# Patient Record
Sex: FEMALE | URBAN - METROPOLITAN AREA
[De-identification: names, ages, dates, MRNs, and addresses within clinical notes are randomized per-mention and may not be internally consistent; named-entity substitution may affect disease eponyms.]

---
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
## 2025-01-04 ENCOUNTER — HOSPITAL ENCOUNTER (EMERGENCY)
Age: 20
Discharge: HOME | End: 2025-01-04
Attending: EMERGENCY MEDICINE

## 2025-01-04 VITALS
SYSTOLIC BLOOD PRESSURE: 103 MMHG | OXYGEN SATURATION: 100 % | WEIGHT: 127 LBS | TEMPERATURE: 98.1 F | RESPIRATION RATE: 16 BRPM | BODY MASS INDEX: 21.16 KG/M2 | DIASTOLIC BLOOD PRESSURE: 75 MMHG | HEIGHT: 65 IN | HEART RATE: 55 BPM

## 2025-01-04 DIAGNOSIS — E86.0 DEHYDRATION: ICD-10-CM

## 2025-01-04 DIAGNOSIS — R19.7 NAUSEA VOMITING AND DIARRHEA: Primary | ICD-10-CM

## 2025-01-04 DIAGNOSIS — R11.2 NAUSEA VOMITING AND DIARRHEA: Primary | ICD-10-CM

## 2025-01-04 LAB
BILIRUB UR QL STRIP: NEGATIVE
CLARITY UR: CLEAR
COLOR UR: YELLOW
COMMENT: NORMAL
EKG ATRIAL RATE: 56 BPM
EKG P AXIS: 4 DEGREES
EKG P-R INTERVAL: 152 MS
EKG Q-T INTERVAL: 394 MS
EKG QRS DURATION: 84 MS
EKG QTC CALCULATION (BAZETT): 380 MS
EKG R AXIS: 82 DEGREES
EKG T AXIS: 60 DEGREES
EKG VENTRICULAR RATE: 56 BPM
GLUCOSE UR STRIP-MCNC: NEGATIVE MG/DL
HCG UR QL: NEGATIVE
HGB UR QL STRIP.AUTO: NEGATIVE
KETONES UR STRIP-MCNC: NEGATIVE MG/DL
LEUKOCYTE ESTERASE UR QL STRIP: NEGATIVE
NITRITE UR QL STRIP: NEGATIVE
PH UR STRIP: 6 [PH] (ref 5–8)
PROT UR STRIP-MCNC: NEGATIVE MG/DL
SP GR UR STRIP: 1.02 (ref 1–1.03)
UROBILINOGEN UR STRIP-ACNC: NORMAL EU/DL (ref 0–1)

## 2025-01-04 PROCEDURE — 93010 ELECTROCARDIOGRAM REPORT: CPT | Performed by: INTERNAL MEDICINE

## 2025-01-04 PROCEDURE — 81025 URINE PREGNANCY TEST: CPT

## 2025-01-04 PROCEDURE — 81003 URINALYSIS AUTO W/O SCOPE: CPT

## 2025-01-04 PROCEDURE — 93005 ELECTROCARDIOGRAM TRACING: CPT | Performed by: EMERGENCY MEDICINE

## 2025-01-04 PROCEDURE — 99284 EMERGENCY DEPT VISIT MOD MDM: CPT

## 2025-01-04 RX ORDER — ONDANSETRON 4 MG/1
4 TABLET, ORALLY DISINTEGRATING ORAL 3 TIMES DAILY PRN
Qty: 21 TABLET | Refills: 0 | Status: SHIPPED | OUTPATIENT
Start: 2025-01-04

## 2025-01-04 ASSESSMENT — ENCOUNTER SYMPTOMS
CONSTIPATION: 0
EYE PAIN: 0
DIARRHEA: 1
SHORTNESS OF BREATH: 0
COUGH: 0
NAUSEA: 1
VOMITING: 0
BACK PAIN: 0
ABDOMINAL PAIN: 0

## 2025-01-04 ASSESSMENT — PAIN - FUNCTIONAL ASSESSMENT
PAIN_FUNCTIONAL_ASSESSMENT: NONE - DENIES PAIN

## 2025-01-04 NOTE — ED PROVIDER NOTES
[facility]  eMERGENCY dEPARTMENT eNCOUnter      Pt Name: Jami Dunham  MRN: [id number]  Birthdate 2005  Date of evaluation: 1/4/2025      CHIEF COMPLAINT       Chief Complaint   Patient presents with    Diarrhea     Diarrhea that started last night, nausea, no emesis, pt may be pregnant.         HISTORY OF PRESENT ILLNESS    Jami Dunham is a 19 y.o. female who presents with an episode of lightheadedness and nausea.  She said last night she had diarrhea and then this morning was nauseated she does not think she is pregnant but there is a possibility she has no belly pain no vaginal bleeding she said last night she was crying a lot she says her crying for yuly as her and her boyfriend had to overcome some kind of difficulty they have been having.  She denies any chest pain or shortness of breath she did not completely pass out she said she is felt really lightheaded at work and her boss helped her sit down squad arrived and picked her up her pressure was in the 90s systolic and they transported her in right now she says she feels back to normal      REVIEW OF SYSTEMS         Review of Systems   Constitutional:  Positive for fatigue. Negative for chills and fever.   HENT:  Negative for congestion and ear pain.    Eyes:  Negative for pain and visual disturbance.   Respiratory:  Negative for cough and shortness of breath.    Cardiovascular:  Negative for chest pain, palpitations and leg swelling.   Gastrointestinal:  Positive for diarrhea and nausea. Negative for abdominal pain, constipation and vomiting.   Endocrine: Negative for polydipsia and polyuria.   Genitourinary:  Negative for difficulty urinating, dysuria, frequency, vaginal bleeding and vaginal discharge.   Musculoskeletal:  Negative for back pain, joint swelling, myalgias, neck pain and neck stiffness.   Skin:  Negative for rash.   Neurological:  Positive for light-headedness. Negative for dizziness, weakness and       General: No focal deficit present.      Mental Status: She is alert and oriented to person, place, and time.   Psychiatric:         Mood and Affect: Mood normal.         Behavior: Behavior normal.           DIFFERENTIAL DIAGNOSIS/ MDM:     Nausea with diarrhea decreased oral intake no abdominal pain will check hydration status EKG urine and urine pregnancy  DIAGNOSTIC RESULTS     EKG: All EKG's are interpreted by the Emergency Department Physician who either signs or Co-signs this chart in the absence of a cardiologist.    Sinus bradycardia with sinus arrhythmia rate of 56 bpm VT interval is 152 ms QRS durations 84 ms QT corrected 380 ms axis is 82 for the QRS there is no acute ST or T wave changes otherwise normal EKG    RADIOLOGY:   I directly visualized the following  images and reviewed the radiologist interpretations:  No orders to display             ED BEDSIDE ULTRASOUND:       LABS:  Labs Reviewed   URINALYSIS WITH REFLEX TO CULTURE   PREGNANCY, URINE         EMERGENCY DEPARTMENT COURSE:   Vitals:    Vitals:    01/04/25 0934   BP: 103/75   Pulse: 55   Resp: 16   Temp: 98.1 °F (36.7 °C)   TempSrc: Oral   SpO2: 100%   Weight: 57.6 kg (127 lb)   Height: 1.651 m (5' 5\")     -------------------------  BP: 103/75, Temp: 98.1 °F (36.7 °C), Pulse: 55, Respirations: 16        Re-evaluation Notes    Is resting comfortably has no symptoms now to get a little tachycardic with standing but says she can take oral fluids I will write her some Zofran she has a ride home    CRITICAL CARE:   None        CONSULTS:      PROCEDURES:  None    FINAL IMPRESSION      1. Nausea vomiting and diarrhea    2. Dehydration          DISPOSITION/PLAN   DISPOSITION Decision To Discharge 01/04/2025 11:24:36 AM   DISPOSITION CONDITION Stable           Condition on Disposition    stable    PATIENT REFERRED TO:  No follow-up provider specified.    DISCHARGE MEDICATIONS:  New Prescriptions    ONDANSETRON (ZOFRAN-ODT) 4 MG DISINTEGRATING

## 2025-06-05 ENCOUNTER — OFFICE VISIT (OUTPATIENT)
Dept: OBGYN CLINIC | Age: 20
End: 2025-06-05

## 2025-06-05 ENCOUNTER — HOSPITAL ENCOUNTER (OUTPATIENT)
Age: 20
Setting detail: SPECIMEN
Discharge: HOME | End: 2025-06-05

## 2025-06-05 VITALS
BODY MASS INDEX: 19.16 KG/M2 | WEIGHT: 115 LBS | DIASTOLIC BLOOD PRESSURE: 72 MMHG | HEIGHT: 65 IN | HEART RATE: 99 BPM | SYSTOLIC BLOOD PRESSURE: 108 MMHG

## 2025-06-05 DIAGNOSIS — O46.90 VAGINAL BLEEDING DURING PREGNANCY: ICD-10-CM

## 2025-06-05 DIAGNOSIS — Z76.89 ENCOUNTER TO ESTABLISH CARE: Primary | ICD-10-CM

## 2025-06-05 DIAGNOSIS — O20.0 THREATENED ABORTION: ICD-10-CM

## 2025-06-05 DIAGNOSIS — N92.6 MISSED MENSES: ICD-10-CM

## 2025-06-05 NOTE — PROGRESS NOTES
DATE OF VISIT:  25  PATIENT NAME:  Jami Dunham     YOB: 2005    SUBJECTIVE:    Chief Complaint:  Chief Complaint   Patient presents with    New Patient       HPI:  Jami  is a new patient being seen today for missed menses.  She reports a  positive pregnancy test on 25.  This  is a planned pregnancy.  She is  accepting at this time.  LMP: 3/15/25      Sure and definite: Yes     28 day cycle: Yes    She was not on a contraceptive at the time of conception.  Estimated weeks gestation today : 11w 5d  Tentative MYRTLE: 25  Patient reports that she was seen in the ER for bleeding in pregnancy this past weekend.  Patient reports that the first day was super heavy with clots.  Patient reports that she is currently bleeding.     Relationship with FOB: involved    OBJECTIVE:    Vitals:    25 0756   BP: 108/72   Pulse: 99   Weight: 52.2 kg (115 lb)   Height: 1.651 m (5' 5\")     Body mass index is 19.14 kg/m².  Patient's last menstrual period was 03/15/2025.    Mother's ethnicity:    Father's ethnicity:      She is complaining today of:   Pain: No  Cramping: Yes  Bleeding or spotting: Yes and described as like a normal menses    Nausea: No  Vomiting: No    Breast enlargement/tenderness: Yes  Fatigue: Yes  Frequency of urination: No    Previous high risk obstetrical history: no  OB History    Para Term  AB Living   2 1 1   1   SAB IAB Ectopic Molar Multiple Live Births        1      # Outcome Date GA Lbr Monty/2nd Weight Sex Type Anes PTL Lv   2 Term 22    M Vag-Spont   ELMA   1                 ROS was done and is negative except as documented in HPI.  History was reviewed as documented on Epic Navigator.    ASSESSMENT:  Missed menses with + UCG  1. Encounter to establish care    2. Vaginal bleeding during pregnancy    3. Threatened     4. Missed menses        PLAN:  UCG was done and noted as positive  Prenatal vitamins

## 2025-06-05 NOTE — PROGRESS NOTES
Patient was in the office today for a quant.    Draw per physician order using sterile technique.  Drawn from the right AC.

## 2025-06-06 ENCOUNTER — RESULTS FOLLOW-UP (OUTPATIENT)
Dept: OBGYN CLINIC | Age: 20
End: 2025-06-06

## 2025-06-06 DIAGNOSIS — Z76.89 ENCOUNTER TO ESTABLISH CARE: ICD-10-CM

## 2025-06-06 DIAGNOSIS — O46.90 VAGINAL BLEEDING DURING PREGNANCY: ICD-10-CM

## 2025-06-06 LAB — B-HCG SERPL EIA 3RD IS-ACNC: NORMAL MIU/ML
